# Patient Record
Sex: MALE | URBAN - METROPOLITAN AREA
[De-identification: names, ages, dates, MRNs, and addresses within clinical notes are randomized per-mention and may not be internally consistent; named-entity substitution may affect disease eponyms.]

---

## 2017-05-31 ENCOUNTER — APPOINTMENT (OUTPATIENT)
Age: 37
Setting detail: DERMATOLOGY
End: 2017-05-31

## 2017-05-31 DIAGNOSIS — B36.0 PITYRIASIS VERSICOLOR: ICD-10-CM

## 2017-05-31 DIAGNOSIS — L81.4 OTHER MELANIN HYPERPIGMENTATION: ICD-10-CM

## 2017-05-31 PROBLEM — L29.8 OTHER PRURITUS: Status: ACTIVE | Noted: 2017-05-31

## 2017-05-31 PROCEDURE — 99202 OFFICE O/P NEW SF 15 MIN: CPT

## 2017-05-31 PROCEDURE — OTHER FOLLOW UP FOR NEXT VISIT: OTHER

## 2017-05-31 PROCEDURE — OTHER MIPS QUALITY: OTHER

## 2017-05-31 PROCEDURE — OTHER TREATMENT REGIMEN: OTHER

## 2017-05-31 PROCEDURE — OTHER PRESCRIPTION: OTHER

## 2017-05-31 RX ORDER — KETOCONAZOLE 200 MG/1
400 MG TABLET ORAL AS DIRECTED
Qty: 6 | Refills: 0 | Status: ERX | COMMUNITY
Start: 2017-05-31

## 2017-05-31 ASSESSMENT — SEVERITY ASSESSMENT
SEVERITY: MILD
SEVERITY: MODERATE

## 2017-05-31 ASSESSMENT — LOCATION DETAILED DESCRIPTION DERM
LOCATION DETAILED: RIGHT CENTRAL MALAR CHEEK
LOCATION DETAILED: RIGHT INFERIOR MEDIAL UPPER BACK

## 2017-05-31 ASSESSMENT — LOCATION ZONE DERM
LOCATION ZONE: FACE
LOCATION ZONE: TRUNK

## 2017-05-31 ASSESSMENT — LOCATION SIMPLE DESCRIPTION DERM
LOCATION SIMPLE: RIGHT UPPER BACK
LOCATION SIMPLE: RIGHT CHEEK

## 2017-05-31 ASSESSMENT — BSA RASH: BSA RASH: 15

## 2017-05-31 NOTE — PROCEDURE: TREATMENT REGIMEN
Detail Level: Zone
Detail Level: Simple
Plan: Prescription for the oral ketoconazole times three doses given to the patient. He will call if no improvement as we may need to treat this for a different condition is the oral antifungal's are not helping. Otherwise follow up as needed
Plan: There is a small patch of slightly hyperpigmented skin on the right cheek, this could be consistent with the tinea versicolor however if no improvement patient will call for further treatment recommendations. I would recommend starting over the counter Differin 0.1% gel

## 2017-05-31 NOTE — HPI: RASH
How Severe Is Your Rash?: moderate
Is This A New Presentation, Or A Follow-Up?: Rash
Additional History: Patient describes a history of tinea versicolor , he has taken oral medication in the past which worked very well. Since then though he has used various topical antifungal medications that do help some but to not ultimately get rid of the problem. He is interested in something that would work better than the topical agents

## 2022-09-29 ENCOUNTER — APPOINTMENT (OUTPATIENT)
Dept: URBAN - METROPOLITAN AREA CLINIC 274 | Age: 42
Setting detail: DERMATOLOGY
End: 2022-09-30

## 2022-09-29 DIAGNOSIS — L21.8 OTHER SEBORRHEIC DERMATITIS: ICD-10-CM

## 2022-09-29 DIAGNOSIS — B36.0 PITYRIASIS VERSICOLOR: ICD-10-CM

## 2022-09-29 DIAGNOSIS — L85.3 XEROSIS CUTIS: ICD-10-CM

## 2022-09-29 PROCEDURE — OTHER PHOTO-DOCUMENTATION: OTHER

## 2022-09-29 PROCEDURE — OTHER EDUCATIONAL RESOURCES PROVIDED: OTHER

## 2022-09-29 PROCEDURE — OTHER MEDICATION COUNSELING: OTHER

## 2022-09-29 PROCEDURE — OTHER PRESCRIPTION: OTHER

## 2022-09-29 PROCEDURE — OTHER PRESCRIPTION SAMPLES PROVIDED: OTHER

## 2022-09-29 PROCEDURE — 99203 OFFICE O/P NEW LOW 30 MIN: CPT

## 2022-09-29 PROCEDURE — OTHER COUNSELING: OTHER

## 2022-09-29 PROCEDURE — OTHER MIPS QUALITY: OTHER

## 2022-09-29 RX ORDER — CLOBETASOL PROPIONATE 0.5 MG/ML
SOLUTION TOPICAL
Qty: 50 | Refills: 5 | Status: ERX | COMMUNITY
Start: 2022-09-29

## 2022-09-29 RX ORDER — KETOCONAZOLE 20 MG/ML
SHAMPOO, SUSPENSION TOPICAL BIW
Qty: 120 | Refills: 11 | Status: ERX | COMMUNITY
Start: 2022-09-29

## 2022-09-29 RX ORDER — FLUCONAZOLE 200 MG/1
TABLET ORAL
Qty: 5 | Refills: 0 | Status: ERX | COMMUNITY
Start: 2022-09-29

## 2022-09-29 ASSESSMENT — LOCATION SIMPLE DESCRIPTION DERM
LOCATION SIMPLE: LEFT PLANTAR SURFACE
LOCATION SIMPLE: RIGHT PLANTAR SURFACE
LOCATION SIMPLE: UPPER BACK
LOCATION SIMPLE: ANTERIOR SCALP

## 2022-09-29 ASSESSMENT — LOCATION ZONE DERM
LOCATION ZONE: SCALP
LOCATION ZONE: FEET
LOCATION ZONE: TRUNK

## 2022-09-29 ASSESSMENT — SEVERITY ASSESSMENT
HOW SEVERE IS THIS PATIENT'S CONDITION?: MILD
SEVERITY: MILD

## 2022-09-29 ASSESSMENT — LOCATION DETAILED DESCRIPTION DERM
LOCATION DETAILED: MID-FRONTAL SCALP
LOCATION DETAILED: RIGHT PLANTAR FOREFOOT OVERLYING 1ST METATARSAL
LOCATION DETAILED: INFERIOR THORACIC SPINE
LOCATION DETAILED: LEFT PLANTAR FOREFOOT OVERLYING 1ST METATARSAL

## 2022-09-29 ASSESSMENT — BSA RASH: BSA RASH: 15

## 2022-09-29 NOTE — PROCEDURE: MEDICATION COUNSELING
16 Ketoconazole Counseling:   Patient counseled regarding improving absorption with orange juice.  Adverse effects include but are not limited to breast enlargement, headache, diarrhea, nausea, upset stomach, liver function test abnormalities, taste disturbance, and stomach pain.  There is a rare possibility of liver failure that can occur when taking ketoconazole. The patient understands that monitoring of LFTs may be required, especially at baseline. The patient verbalized understanding of the proper use and possible adverse effects of ketoconazole.  All of the patient's questions and concerns were addressed.

## 2022-09-29 NOTE — HPI: RASH
Is The Patient Presenting As Previously Scheduled?: Yes
How Severe Is Your Rash?: mild
Is This A New Presentation, Or A Follow-Up?: Rash
Additional History: Pt has only used Tinactin on his back.
